# Patient Record
Sex: MALE | Race: WHITE | HISPANIC OR LATINO | URBAN - METROPOLITAN AREA
[De-identification: names, ages, dates, MRNs, and addresses within clinical notes are randomized per-mention and may not be internally consistent; named-entity substitution may affect disease eponyms.]

---

## 2018-01-27 NOTE — PATIENT DISCUSSION
Monitor for changes. OTC Zaditor or Alaway prn for itching. Call if symptoms worsen or do not improve. [id number]  in 1 year for exam, sooner if problems or changes.

## 2020-03-16 ENCOUNTER — NEW REFERRAL (OUTPATIENT)
Dept: URBAN - METROPOLITAN AREA CLINIC 33 | Facility: CLINIC | Age: 70
End: 2020-03-16

## 2020-03-16 VITALS
DIASTOLIC BLOOD PRESSURE: 80 MMHG | HEART RATE: 64 BPM | SYSTOLIC BLOOD PRESSURE: 120 MMHG | WEIGHT: 178 LBS | HEIGHT: 72 IN | BODY MASS INDEX: 24.11 KG/M2

## 2020-03-16 DIAGNOSIS — E11.9: ICD-10-CM

## 2020-03-16 DIAGNOSIS — H35.361: ICD-10-CM

## 2020-03-16 PROCEDURE — 92004 COMPRE OPH EXAM NEW PT 1/>: CPT

## 2020-03-16 PROCEDURE — 92250 FUNDUS PHOTOGRAPHY W/I&R: CPT

## 2020-03-16 ASSESSMENT — VISUAL ACUITY
OS_SC: 20/25+1
OD_SC: 20/40-1

## 2020-03-16 ASSESSMENT — TONOMETRY
OD_IOP_MMHG: 7
OS_IOP_MMHG: 10

## 2020-06-29 ENCOUNTER — FOLLOW UP (OUTPATIENT)
Dept: URBAN - METROPOLITAN AREA CLINIC 33 | Facility: CLINIC | Age: 70
End: 2020-06-29

## 2020-06-29 DIAGNOSIS — Z96.1: ICD-10-CM

## 2020-06-29 DIAGNOSIS — H35.361: ICD-10-CM

## 2020-06-29 DIAGNOSIS — E11.9: ICD-10-CM

## 2020-06-29 DIAGNOSIS — H25.12: ICD-10-CM

## 2020-06-29 PROCEDURE — 92134 CPTRZ OPH DX IMG PST SGM RTA: CPT

## 2020-06-29 PROCEDURE — 92014 COMPRE OPH EXAM EST PT 1/>: CPT

## 2020-06-29 PROCEDURE — 92250 FUNDUS PHOTOGRAPHY W/I&R: CPT

## 2020-06-29 ASSESSMENT — TONOMETRY
OS_IOP_MMHG: 13
OD_IOP_MMHG: 10

## 2020-06-29 ASSESSMENT — VISUAL ACUITY
OS_CC: 20/20
OD_CC: 20/25

## 2020-09-28 ENCOUNTER — FOLLOW UP (OUTPATIENT)
Dept: URBAN - METROPOLITAN AREA CLINIC 33 | Facility: CLINIC | Age: 70
End: 2020-09-28

## 2020-09-28 DIAGNOSIS — Z96.1: ICD-10-CM

## 2020-09-28 DIAGNOSIS — E11.9: ICD-10-CM

## 2020-09-28 DIAGNOSIS — H35.361: ICD-10-CM

## 2020-09-28 PROCEDURE — 92250 FUNDUS PHOTOGRAPHY W/I&R: CPT

## 2020-09-28 PROCEDURE — 92014 COMPRE OPH EXAM EST PT 1/>: CPT

## 2020-09-28 ASSESSMENT — TONOMETRY
OD_IOP_MMHG: 10
OS_IOP_MMHG: 12

## 2020-09-28 ASSESSMENT — VISUAL ACUITY
OS_CC: 20/25+2
OD_CC: 20/20-2

## 2020-12-28 ENCOUNTER — NEW PATIENT COMPREHENSIVE (OUTPATIENT)
Dept: URBAN - METROPOLITAN AREA CLINIC 32 | Facility: CLINIC | Age: 70
End: 2020-12-28

## 2020-12-28 DIAGNOSIS — Z96.1: ICD-10-CM

## 2020-12-28 DIAGNOSIS — H25.12: ICD-10-CM

## 2020-12-28 PROCEDURE — 92002 INTRM OPH EXAM NEW PATIENT: CPT

## 2020-12-28 ASSESSMENT — KERATOMETRY
OS_AXISANGLE2_DEGREES: 62
OS_AXISANGLE_DEGREES: 152
OD_K1POWER_DIOPTERS: 43.75
OD_AXISANGLE_DEGREES: 173
OD_AXISANGLE2_DEGREES: 83
OD_K2POWER_DIOPTERS: 43.25
OS_K2POWER_DIOPTERS: 43.75
OS_K1POWER_DIOPTERS: 44.25

## 2020-12-28 ASSESSMENT — VISUAL ACUITY
OD_CC: 20/40
OS_CC: 20/30-1
OS_CC: J1
OD_CC: J5
OS_SC: 20/80
OD_SC: 20/100

## 2020-12-28 ASSESSMENT — TONOMETRY
OD_IOP_MMHG: 10
OS_IOP_MMHG: 14
OS_IOP_MMHG: 15
OD_IOP_MMHG: 10

## 2021-01-01 ENCOUNTER — OFFICE VISIT (OUTPATIENT)
Dept: URBAN - METROPOLITAN AREA CLINIC 68 | Facility: CLINIC | Age: 71
End: 2021-01-01

## 2021-05-03 ENCOUNTER — FOLLOW UP (OUTPATIENT)
Dept: URBAN - METROPOLITAN AREA CLINIC 33 | Facility: CLINIC | Age: 71
End: 2021-05-03

## 2021-05-03 VITALS — WEIGHT: 183 LBS | HEIGHT: 68 IN | BODY MASS INDEX: 27.74 KG/M2

## 2021-05-03 DIAGNOSIS — E11.9: ICD-10-CM

## 2021-05-03 DIAGNOSIS — H35.361: ICD-10-CM

## 2021-05-03 DIAGNOSIS — H25.12: ICD-10-CM

## 2021-05-03 DIAGNOSIS — H35.371: ICD-10-CM

## 2021-05-03 PROCEDURE — 92250 FUNDUS PHOTOGRAPHY W/I&R: CPT

## 2021-05-03 PROCEDURE — 92014 COMPRE OPH EXAM EST PT 1/>: CPT

## 2021-05-03 ASSESSMENT — TONOMETRY
OD_IOP_MMHG: 12
OS_IOP_MMHG: 13

## 2021-05-03 ASSESSMENT — VISUAL ACUITY
OS_CC: 20/20
OD_CC: 20/20-2

## 2021-07-12 ENCOUNTER — FOLLOW UP (OUTPATIENT)
Dept: URBAN - METROPOLITAN AREA CLINIC 32 | Facility: CLINIC | Age: 71
End: 2021-07-12

## 2021-07-12 DIAGNOSIS — H25.12: ICD-10-CM

## 2021-07-12 DIAGNOSIS — Z96.1: ICD-10-CM

## 2021-07-12 PROCEDURE — 92012 INTRM OPH EXAM EST PATIENT: CPT

## 2021-07-12 ASSESSMENT — TONOMETRY
OS_IOP_MMHG: 8
OD_IOP_MMHG: 6

## 2021-07-12 ASSESSMENT — VISUAL ACUITY
OD_CC: 20/25-2
OS_CC: 20/20

## 2021-07-12 ASSESSMENT — KERATOMETRY
OD_AXISANGLE_DEGREES: 173
OS_AXISANGLE2_DEGREES: 62
OD_AXISANGLE2_DEGREES: 83
OD_K2POWER_DIOPTERS: 43.25
OD_K1POWER_DIOPTERS: 43.75
OS_K2POWER_DIOPTERS: 43.75
OS_K1POWER_DIOPTERS: 44.25
OS_AXISANGLE_DEGREES: 152

## 2021-09-02 ENCOUNTER — OFFICE VISIT (OUTPATIENT)
Dept: URBAN - METROPOLITAN AREA CLINIC 68 | Facility: CLINIC | Age: 71
End: 2021-09-02

## 2021-09-09 ENCOUNTER — TELEPHONE ENCOUNTER (OUTPATIENT)
Dept: URBAN - METROPOLITAN AREA CLINIC 68 | Facility: CLINIC | Age: 71
End: 2021-09-09

## 2021-09-10 ENCOUNTER — OFFICE VISIT (OUTPATIENT)
Dept: URBAN - METROPOLITAN AREA SURGERY CENTER 12 | Facility: SURGERY CENTER | Age: 71
End: 2021-09-10

## 2021-09-14 ENCOUNTER — TELEPHONE ENCOUNTER (OUTPATIENT)
Dept: URBAN - METROPOLITAN AREA CLINIC 68 | Facility: CLINIC | Age: 71
End: 2021-09-14

## 2021-09-15 ENCOUNTER — TELEPHONE ENCOUNTER (OUTPATIENT)
Dept: URBAN - METROPOLITAN AREA CLINIC 68 | Facility: CLINIC | Age: 71
End: 2021-09-15

## 2021-09-15 ENCOUNTER — OFFICE VISIT (OUTPATIENT)
Dept: URBAN - METROPOLITAN AREA SURGERY CENTER 12 | Facility: SURGERY CENTER | Age: 71
End: 2021-09-15

## 2021-09-16 ENCOUNTER — LAB OUTSIDE AN ENCOUNTER (OUTPATIENT)
Dept: URBAN - METROPOLITAN AREA CLINIC 68 | Facility: CLINIC | Age: 71
End: 2021-09-16

## 2021-09-16 LAB — 01: (no result)

## 2021-09-17 ENCOUNTER — TELEPHONE ENCOUNTER (OUTPATIENT)
Dept: URBAN - METROPOLITAN AREA CLINIC 68 | Facility: CLINIC | Age: 71
End: 2021-09-17

## 2021-09-30 ENCOUNTER — OFFICE VISIT (OUTPATIENT)
Dept: URBAN - METROPOLITAN AREA SURGERY CENTER 12 | Facility: SURGERY CENTER | Age: 71
End: 2021-09-30

## 2022-06-04 ENCOUNTER — TELEPHONE ENCOUNTER (OUTPATIENT)
Dept: URBAN - METROPOLITAN AREA CLINIC 68 | Facility: CLINIC | Age: 72
End: 2022-06-04

## 2022-06-04 RX ORDER — SODIUM SULFATE, POTASSIUM SULFATE, MAGNESIUM SULFATE 17.5; 3.13; 1.6 G/ML; G/ML; G/ML
SOLUTION, CONCENTRATE ORAL AS DIRECTED
Qty: 1 | Refills: 0 | OUTPATIENT
Start: 2021-09-02 | End: 2021-09-03

## 2022-06-05 ENCOUNTER — TELEPHONE ENCOUNTER (OUTPATIENT)
Dept: URBAN - METROPOLITAN AREA CLINIC 68 | Facility: CLINIC | Age: 72
End: 2022-06-05

## 2022-06-05 RX ORDER — CLOPIDOGREL BISULFATE 75 MG
PLAVIX( 75MG ORAL   ) ACTIVE -HX ENTRY TABLET ORAL
Status: ACTIVE | COMMUNITY
Start: 2021-09-02

## 2022-06-05 RX ORDER — SILODOSIN 4 MG/1
RAPAFLO( 4MG ORAL   ) ACTIVE -HX ENTRY CAPSULE ORAL
Status: ACTIVE | COMMUNITY
Start: 2021-09-02

## 2022-06-05 RX ORDER — PITAVASTATIN CALCIUM 1.04 MG/1
LIVALO( 1MG ORAL   ) ACTIVE -HX ENTRY TABLET, FILM COATED ORAL
Status: ACTIVE | COMMUNITY
Start: 2021-09-02

## 2022-06-05 RX ORDER — OMEGA-3-ACID ETHYL ESTERS 1 G/1
LOVAZA( 1GM ORAL   ) ACTIVE -HX ENTRY CAPSULE, LIQUID FILLED ORAL
Status: ACTIVE | COMMUNITY
Start: 2021-09-02

## 2022-06-05 RX ORDER — CARVEDILOL 3.12 MG/1
CARVEDILOL( 3.125MG ORAL   ) ACTIVE -HX ENTRY TABLET, FILM COATED ORAL
Status: ACTIVE | COMMUNITY
Start: 2021-09-02

## 2022-06-05 RX ORDER — MEMANTINE HYDROCHLORIDE 5 MG/1
MEMANTINE HCL( 5MG ORAL   ) ACTIVE -HX ENTRY TABLET, FILM COATED ORAL
Status: ACTIVE | COMMUNITY
Start: 2021-09-02

## 2022-06-05 RX ORDER — METFORMIN HYDROCHLORIDE 500 MG/1
METFORMIN HCL( 500MG ORAL   ) ACTIVE -HX ENTRY TABLET, COATED ORAL
Status: ACTIVE | COMMUNITY
Start: 2021-09-02

## 2022-06-25 ENCOUNTER — TELEPHONE ENCOUNTER (OUTPATIENT)
Age: 72
End: 2022-06-25

## 2022-06-25 RX ORDER — SODIUM SULFATE, POTASSIUM SULFATE, MAGNESIUM SULFATE 17.5; 3.13; 1.6 G/ML; G/ML; G/ML
SOLUTION, CONCENTRATE ORAL AS DIRECTED
Qty: 1 | Refills: 0 | OUTPATIENT
Start: 2021-09-02 | End: 2021-09-03

## 2022-06-26 ENCOUNTER — TELEPHONE ENCOUNTER (OUTPATIENT)
Age: 72
End: 2022-06-26

## 2022-06-26 RX ORDER — CARVEDILOL 3.12 MG/1
CARVEDILOL( 3.125MG ORAL   ) ACTIVE -HX ENTRY TABLET, FILM COATED ORAL
Status: ACTIVE | COMMUNITY
Start: 2021-09-02

## 2022-06-26 RX ORDER — PITAVASTATIN CALCIUM 1.04 MG/1
LIVALO( 1MG ORAL   ) ACTIVE -HX ENTRY TABLET, FILM COATED ORAL
Status: ACTIVE | COMMUNITY
Start: 2021-09-02

## 2022-06-26 RX ORDER — METFORMIN HCL 500 MG/1
METFORMIN HCL( 500MG ORAL   ) ACTIVE -HX ENTRY TABLET ORAL
Status: ACTIVE | COMMUNITY
Start: 2021-09-02

## 2022-06-26 RX ORDER — SILODOSIN 4 MG/1
RAPAFLO( 4MG ORAL   ) ACTIVE -HX ENTRY CAPSULE ORAL
Status: ACTIVE | COMMUNITY
Start: 2021-09-02

## 2022-06-26 RX ORDER — MEMANTINE HYDROCHLORIDE 5 MG/1
MEMANTINE HCL( 5MG ORAL   ) ACTIVE -HX ENTRY TABLET, FILM COATED ORAL
Status: ACTIVE | COMMUNITY
Start: 2021-09-02

## 2022-06-26 RX ORDER — OMEGA-3-ACID ETHYL ESTERS 1 G/1
LOVAZA( 1GM ORAL   ) ACTIVE -HX ENTRY CAPSULE, LIQUID FILLED ORAL
Status: ACTIVE | COMMUNITY
Start: 2021-09-02

## 2022-06-26 RX ORDER — CLOPIDOGREL BISULFATE 75 MG
PLAVIX( 75MG ORAL   ) ACTIVE -HX ENTRY TABLET ORAL
Status: ACTIVE | COMMUNITY
Start: 2021-09-02

## 2023-08-22 ENCOUNTER — OFFICE VISIT (OUTPATIENT)
Dept: URBAN - METROPOLITAN AREA CLINIC 68 | Facility: CLINIC | Age: 73
End: 2023-08-22
Payer: MEDICARE

## 2023-08-22 ENCOUNTER — WEB ENCOUNTER (OUTPATIENT)
Dept: URBAN - METROPOLITAN AREA CLINIC 68 | Facility: CLINIC | Age: 73
End: 2023-08-22

## 2023-08-22 ENCOUNTER — LAB OUTSIDE AN ENCOUNTER (OUTPATIENT)
Dept: URBAN - METROPOLITAN AREA CLINIC 68 | Facility: CLINIC | Age: 73
End: 2023-08-22

## 2023-08-22 VITALS
DIASTOLIC BLOOD PRESSURE: 82 MMHG | OXYGEN SATURATION: 98 % | WEIGHT: 184 LBS | HEIGHT: 71 IN | HEART RATE: 69 BPM | SYSTOLIC BLOOD PRESSURE: 140 MMHG | BODY MASS INDEX: 25.76 KG/M2

## 2023-08-22 DIAGNOSIS — K62.89 NODULE OF ANUS: ICD-10-CM

## 2023-08-22 PROCEDURE — 99214 OFFICE O/P EST MOD 30 MIN: CPT | Performed by: INTERNAL MEDICINE

## 2023-08-22 RX ORDER — MEMANTINE HYDROCHLORIDE 5 MG/1
MEMANTINE HCL( 5MG ORAL   ) ACTIVE -HX ENTRY TABLET, FILM COATED ORAL
Status: ACTIVE | COMMUNITY
Start: 2021-09-02

## 2023-08-22 RX ORDER — CLOPIDOGREL BISULFATE 75 MG
PLAVIX( 75MG ORAL   ) ACTIVE -HX ENTRY TABLET ORAL
Status: ACTIVE | COMMUNITY
Start: 2021-09-02

## 2023-08-22 RX ORDER — PITAVASTATIN CALCIUM 1.04 MG/1
LIVALO( 1MG ORAL   ) ACTIVE -HX ENTRY TABLET, FILM COATED ORAL
Status: ACTIVE | COMMUNITY
Start: 2021-09-02

## 2023-08-22 RX ORDER — SILODOSIN 4 MG/1
RAPAFLO( 4MG ORAL   ) ACTIVE -HX ENTRY CAPSULE ORAL
Status: ACTIVE | COMMUNITY
Start: 2021-09-02

## 2023-08-22 RX ORDER — CARVEDILOL 3.12 MG/1
CARVEDILOL( 3.125MG ORAL   ) ACTIVE -HX ENTRY TABLET, FILM COATED ORAL
Status: ACTIVE | COMMUNITY
Start: 2021-09-02

## 2023-08-22 RX ORDER — METFORMIN HCL 500 MG/1
METFORMIN HCL( 500MG ORAL   ) ACTIVE -HX ENTRY TABLET ORAL
Status: ACTIVE | COMMUNITY
Start: 2021-09-02

## 2023-08-22 RX ORDER — OMEGA-3-ACID ETHYL ESTERS 1 G/1
LOVAZA( 1GM ORAL   ) ACTIVE -HX ENTRY CAPSULE, LIQUID FILLED ORAL
Status: ACTIVE | COMMUNITY
Start: 2021-09-02

## 2023-08-22 NOTE — HPI-TODAY'S VISIT:
Case of a 73 YOM that comes in today for evaluation. He refers that for the past 3 months he has been experiencing perianal discomfort.  He feels a perianal bulge.  He also complains of itching in the rectal area.  Denies hematochezia/rectal bleeding.  Denies pain at defecation.  He comes in today for evaluation.

## 2023-08-22 NOTE — PHYSICAL EXAM GASTROINTESTINAL
Abdomen , soft, nontender, nondistended , no guarding or rigidity , no masses palpable , normal bowel sounds , Liver and Spleen , no hepatomegaly present , no hepatosplenomegaly , liver nontender , spleen not palpable rectal: Digital rectal exam performed no perianal bulge noted no rectal tenderness upon digital rectal exam Advised patient.

## 2023-09-07 ENCOUNTER — OFFICE VISIT (OUTPATIENT)
Dept: URBAN - METROPOLITAN AREA SURGERY CENTER 12 | Facility: SURGERY CENTER | Age: 73
End: 2023-09-07
Payer: MEDICARE

## 2023-09-07 ENCOUNTER — TELEPHONE ENCOUNTER (OUTPATIENT)
Dept: URBAN - METROPOLITAN AREA CLINIC 68 | Facility: CLINIC | Age: 73
End: 2023-09-07

## 2023-09-07 ENCOUNTER — LAB OUTSIDE AN ENCOUNTER (OUTPATIENT)
Dept: URBAN - METROPOLITAN AREA CLINIC 68 | Facility: CLINIC | Age: 73
End: 2023-09-07

## 2023-09-07 DIAGNOSIS — K64.8 OTHER HEMORRHOIDS: ICD-10-CM

## 2023-09-07 DIAGNOSIS — K63.89 OTHER SPECIFIED DISEASES OF INTESTINE: ICD-10-CM

## 2023-09-07 PROCEDURE — 45330 DIAGNOSTIC SIGMOIDOSCOPY: CPT | Performed by: INTERNAL MEDICINE

## 2023-09-07 RX ORDER — SILODOSIN 4 MG/1
RAPAFLO( 4MG ORAL   ) ACTIVE -HX ENTRY CAPSULE ORAL
Status: ACTIVE | COMMUNITY
Start: 2021-09-02

## 2023-09-07 RX ORDER — CLOPIDOGREL BISULFATE 75 MG
PLAVIX( 75MG ORAL   ) ACTIVE -HX ENTRY TABLET ORAL
Status: ACTIVE | COMMUNITY
Start: 2021-09-02

## 2023-09-07 RX ORDER — MEMANTINE HYDROCHLORIDE 5 MG/1
MEMANTINE HCL( 5MG ORAL   ) ACTIVE -HX ENTRY TABLET, FILM COATED ORAL
Status: ACTIVE | COMMUNITY
Start: 2021-09-02

## 2023-09-07 RX ORDER — METFORMIN HCL 500 MG/1
METFORMIN HCL( 500MG ORAL   ) ACTIVE -HX ENTRY TABLET ORAL
Status: ACTIVE | COMMUNITY
Start: 2021-09-02

## 2023-09-07 RX ORDER — OMEGA-3-ACID ETHYL ESTERS 1 G/1
LOVAZA( 1GM ORAL   ) ACTIVE -HX ENTRY CAPSULE, LIQUID FILLED ORAL
Status: ACTIVE | COMMUNITY
Start: 2021-09-02

## 2023-09-07 RX ORDER — CARVEDILOL 3.12 MG/1
CARVEDILOL( 3.125MG ORAL   ) ACTIVE -HX ENTRY TABLET, FILM COATED ORAL
Status: ACTIVE | COMMUNITY
Start: 2021-09-02

## 2023-09-07 RX ORDER — PITAVASTATIN CALCIUM 1.04 MG/1
LIVALO( 1MG ORAL   ) ACTIVE -HX ENTRY TABLET, FILM COATED ORAL
Status: ACTIVE | COMMUNITY
Start: 2021-09-02

## 2023-10-03 ENCOUNTER — TELEPHONE ENCOUNTER (OUTPATIENT)
Dept: URBAN - METROPOLITAN AREA CLINIC 68 | Facility: CLINIC | Age: 73
End: 2023-10-03

## 2023-10-10 ENCOUNTER — OFFICE VISIT (OUTPATIENT)
Dept: URBAN - METROPOLITAN AREA CLINIC 68 | Facility: CLINIC | Age: 73
End: 2023-10-10
Payer: MEDICARE

## 2023-10-10 ENCOUNTER — TELEPHONE ENCOUNTER (OUTPATIENT)
Dept: URBAN - METROPOLITAN AREA CLINIC 68 | Facility: CLINIC | Age: 73
End: 2023-10-10

## 2023-10-10 VITALS
HEIGHT: 71 IN | DIASTOLIC BLOOD PRESSURE: 80 MMHG | SYSTOLIC BLOOD PRESSURE: 138 MMHG | BODY MASS INDEX: 25.76 KG/M2 | OXYGEN SATURATION: 98 % | WEIGHT: 184 LBS | HEART RATE: 70 BPM

## 2023-10-10 DIAGNOSIS — N40.0 ENLARGED PROSTATE: ICD-10-CM

## 2023-10-10 DIAGNOSIS — K62.89 NODULE OF ANUS: ICD-10-CM

## 2023-10-10 PROBLEM — 249607009: Status: ACTIVE | Noted: 2023-10-10

## 2023-10-10 PROBLEM — 119523007: Status: ACTIVE | Noted: 2023-08-22

## 2023-10-10 PROCEDURE — 99212 OFFICE O/P EST SF 10 MIN: CPT | Performed by: INTERNAL MEDICINE

## 2023-10-10 RX ORDER — CARVEDILOL 3.12 MG/1
CARVEDILOL( 3.125MG ORAL   ) ACTIVE -HX ENTRY TABLET, FILM COATED ORAL
Status: ACTIVE | COMMUNITY
Start: 2021-09-02

## 2023-10-10 RX ORDER — METFORMIN HCL 500 MG/1
METFORMIN HCL( 500MG ORAL   ) ACTIVE -HX ENTRY TABLET ORAL
Status: ACTIVE | COMMUNITY
Start: 2021-09-02

## 2023-10-10 RX ORDER — CLOPIDOGREL BISULFATE 75 MG
PLAVIX( 75MG ORAL   ) ACTIVE -HX ENTRY TABLET ORAL
Status: ACTIVE | COMMUNITY
Start: 2021-09-02

## 2023-10-10 RX ORDER — PITAVASTATIN CALCIUM 1.04 MG/1
LIVALO( 1MG ORAL   ) ACTIVE -HX ENTRY TABLET, FILM COATED ORAL
Status: ACTIVE | COMMUNITY
Start: 2021-09-02

## 2023-10-10 RX ORDER — OMEGA-3-ACID ETHYL ESTERS 1 G/1
LOVAZA( 1GM ORAL   ) ACTIVE -HX ENTRY CAPSULE, LIQUID FILLED ORAL
Status: ACTIVE | COMMUNITY
Start: 2021-09-02

## 2023-10-10 RX ORDER — SILODOSIN 4 MG/1
RAPAFLO( 4MG ORAL   ) ACTIVE -HX ENTRY CAPSULE ORAL
Status: ACTIVE | COMMUNITY
Start: 2021-09-02

## 2023-10-10 RX ORDER — MEMANTINE HYDROCHLORIDE 5 MG/1
MEMANTINE HCL( 5MG ORAL   ) ACTIVE -HX ENTRY TABLET, FILM COATED ORAL
Status: ACTIVE | COMMUNITY
Start: 2021-09-02

## 2023-10-10 NOTE — HPI-TODAY'S VISIT:
Case of a 73 YOM that comes in today for evaluation. He refers that for the past 3 months he has been experiencing perianal discomfort.  He feels a perianal bulge.  He also complains of itching in the rectal area. He was started in local care with cortisone base hemorrhoidal ointments. He also underwent Flex sig and ct of the pelvis. Results are detailed below. He comes in today for follow up.  He refers he is feeling fine.  His perianal symptoms have resolved.

## 2024-02-08 ENCOUNTER — OV EP (OUTPATIENT)
Dept: URBAN - METROPOLITAN AREA CLINIC 68 | Facility: CLINIC | Age: 74
End: 2024-02-08
Payer: MEDICARE

## 2024-02-08 VITALS
HEIGHT: 71 IN | SYSTOLIC BLOOD PRESSURE: 132 MMHG | WEIGHT: 184 LBS | DIASTOLIC BLOOD PRESSURE: 80 MMHG | BODY MASS INDEX: 25.76 KG/M2

## 2024-02-08 DIAGNOSIS — L29.0 RECTAL ITCHING: ICD-10-CM

## 2024-02-08 DIAGNOSIS — K62.89 ANAL BURNING: ICD-10-CM

## 2024-02-08 PROBLEM — 68653001: Status: ACTIVE | Noted: 2024-02-08

## 2024-02-08 PROBLEM — 90446007: Status: ACTIVE | Noted: 2024-02-08

## 2024-02-08 PROCEDURE — 99214 OFFICE O/P EST MOD 30 MIN: CPT | Performed by: INTERNAL MEDICINE

## 2024-02-08 RX ORDER — SILODOSIN 4 MG/1
RAPAFLO( 4MG ORAL   ) ACTIVE -HX ENTRY CAPSULE ORAL
Status: ACTIVE | COMMUNITY
Start: 2021-09-02

## 2024-02-08 RX ORDER — CARVEDILOL 3.12 MG/1
CARVEDILOL( 3.125MG ORAL   ) ACTIVE -HX ENTRY TABLET, FILM COATED ORAL
Status: ACTIVE | COMMUNITY
Start: 2021-09-02

## 2024-02-08 RX ORDER — CLOPIDOGREL BISULFATE 75 MG
PLAVIX( 75MG ORAL   ) ACTIVE -HX ENTRY TABLET ORAL
Status: ACTIVE | COMMUNITY
Start: 2021-09-02

## 2024-02-08 RX ORDER — MEMANTINE HYDROCHLORIDE 5 MG/1
MEMANTINE HCL( 5MG ORAL   ) ACTIVE -HX ENTRY TABLET, FILM COATED ORAL
Status: ACTIVE | COMMUNITY
Start: 2021-09-02

## 2024-02-08 RX ORDER — PITAVASTATIN CALCIUM 1.04 MG/1
LIVALO( 1MG ORAL   ) ACTIVE -HX ENTRY TABLET, FILM COATED ORAL
Status: ACTIVE | COMMUNITY
Start: 2021-09-02

## 2024-02-08 RX ORDER — OMEGA-3-ACID ETHYL ESTERS 1 G/1
LOVAZA( 1GM ORAL   ) ACTIVE -HX ENTRY CAPSULE, LIQUID FILLED ORAL
Status: ACTIVE | COMMUNITY
Start: 2021-09-02

## 2024-02-08 RX ORDER — METFORMIN HCL 500 MG/1
METFORMIN HCL( 500MG ORAL   ) ACTIVE -HX ENTRY TABLET ORAL
Status: ACTIVE | COMMUNITY
Start: 2021-09-02

## 2024-02-08 NOTE — PHYSICAL EXAM GASTROINTESTINAL
Abdomen , soft, nontender, nondistended , no guarding or rigidity , no masses palpable , normal bowel sounds , Liver and Spleen , no hepatomegaly present , no hepatosplenomegaly , liver nontender , spleen not palpable Rectal: Julieth Tom present no masses lesion seen no thrombose hemorrhoid seen

## 2024-02-08 NOTE — HPI-TODAY'S VISIT:
Case of a 73-year-old male patient that comes in today for evaluation.  Patient refers that for some time now he has been experiencing what he describes as perianal discomfort.  He voices perianal itching and burning.  Some burning pain after defecation.  He has used over-the-counter hemorrhoidal ointments with limited response.  Denies melena hematochezia hematemesis coffee-ground emesis.  He comes in today for evaluation.

## 2024-02-21 ENCOUNTER — OV EP (OUTPATIENT)
Dept: URBAN - METROPOLITAN AREA CLINIC 68 | Facility: CLINIC | Age: 74
End: 2024-02-21

## 2024-02-21 VITALS
BODY MASS INDEX: 25.76 KG/M2 | DIASTOLIC BLOOD PRESSURE: 78 MMHG | HEIGHT: 71 IN | SYSTOLIC BLOOD PRESSURE: 130 MMHG | WEIGHT: 184 LBS

## 2024-02-21 RX ORDER — OMEGA-3-ACID ETHYL ESTERS 1 G/1
LOVAZA( 1GM ORAL   ) ACTIVE -HX ENTRY CAPSULE, LIQUID FILLED ORAL
Status: ACTIVE | COMMUNITY
Start: 2021-09-02

## 2024-02-21 RX ORDER — MEMANTINE HYDROCHLORIDE 5 MG/1
MEMANTINE HCL( 5MG ORAL   ) ACTIVE -HX ENTRY TABLET, FILM COATED ORAL
Status: ACTIVE | COMMUNITY
Start: 2021-09-02

## 2024-02-21 RX ORDER — CLOPIDOGREL BISULFATE 75 MG
PLAVIX( 75MG ORAL   ) ACTIVE -HX ENTRY TABLET ORAL
Status: ACTIVE | COMMUNITY
Start: 2021-09-02

## 2024-02-21 RX ORDER — CARVEDILOL 3.12 MG/1
CARVEDILOL( 3.125MG ORAL   ) ACTIVE -HX ENTRY TABLET, FILM COATED ORAL
Status: ACTIVE | COMMUNITY
Start: 2021-09-02

## 2024-02-21 RX ORDER — METFORMIN HCL 500 MG/1
METFORMIN HCL( 500MG ORAL   ) ACTIVE -HX ENTRY TABLET ORAL
Status: ACTIVE | COMMUNITY
Start: 2021-09-02

## 2024-02-21 RX ORDER — SILODOSIN 4 MG/1
RAPAFLO( 4MG ORAL   ) ACTIVE -HX ENTRY CAPSULE ORAL
Status: ACTIVE | COMMUNITY
Start: 2021-09-02

## 2024-02-21 RX ORDER — PITAVASTATIN CALCIUM 1.04 MG/1
LIVALO( 1MG ORAL   ) ACTIVE -HX ENTRY TABLET, FILM COATED ORAL
Status: ACTIVE | COMMUNITY
Start: 2021-09-02

## 2024-02-21 NOTE — HPI-TODAY'S VISIT:
Case of a 73-year-old male patient that comes in today for evaluation.  Patient refers that for some time now he has been experiencing what he describes as perianal discomfort.  He voices perianal itching and burning.  Some burning pain after defecation.  He has used over-the-counter hemorrhoidal ointments with limited response.  Denies melena hematochezia hematemesis coffee-ground emesis.  He comes in today for evaluation. He refers today that his symptoms have resolve.

## 2024-02-21 NOTE — PHYSICAL EXAM SKIN:
.  Chief Complaint   Patient presents with    Dental Pain     Edema noted to upper lip and face      Pt ambulate to triage with above complaint. Pain x 3 days.    Pt educated on triage process and returned to lobby.    no rashes , no jaundice present , good turgor , no masses , no tenderness on palpation

## 2024-02-21 NOTE — HPI-TODAY'S VISIT:
Case of a 73-year-old male patient that comes in today for evaluation.  Patient refers that for some time now he has been experiencing what he describes as perianal discomfort.  He voices perianal itching and burning.  Some burning pain after defecation.  He has used over-the-counter hemorrhoidal ointments with limited response. On the last appointment he was started on cortisone base hemorrhoidal ointment and fiber supplementation. He comes in today for follow up     Denies melena hematochezia hematemesis coffee-ground emesis.

## 2024-02-22 ENCOUNTER — OV EP (OUTPATIENT)
Dept: URBAN - METROPOLITAN AREA CLINIC 68 | Facility: CLINIC | Age: 74
End: 2024-02-22

## 2024-05-24 ENCOUNTER — TELEPHONE ENCOUNTER (OUTPATIENT)
Dept: URBAN - METROPOLITAN AREA CLINIC 68 | Facility: CLINIC | Age: 74
End: 2024-05-24

## 2024-05-28 ENCOUNTER — TELEPHONE ENCOUNTER (OUTPATIENT)
Dept: URBAN - METROPOLITAN AREA CLINIC 68 | Facility: CLINIC | Age: 74
End: 2024-05-28

## 2024-05-31 ENCOUNTER — DASHBOARD ENCOUNTERS (OUTPATIENT)
Age: 74
End: 2024-05-31

## 2024-06-05 ENCOUNTER — LAB OUTSIDE AN ENCOUNTER (OUTPATIENT)
Dept: URBAN - METROPOLITAN AREA CLINIC 68 | Facility: CLINIC | Age: 74
End: 2024-06-05

## 2024-06-05 ENCOUNTER — OFFICE VISIT (OUTPATIENT)
Dept: URBAN - METROPOLITAN AREA CLINIC 68 | Facility: CLINIC | Age: 74
End: 2024-06-05
Payer: MEDICARE

## 2024-06-05 DIAGNOSIS — K64.1 GRADE II HEMORRHOIDS: ICD-10-CM

## 2024-06-05 PROCEDURE — 46221 LIGATION OF HEMORRHOID(S): CPT | Performed by: INTERNAL MEDICINE

## 2024-06-05 PROCEDURE — 99212 OFFICE O/P EST SF 10 MIN: CPT | Performed by: INTERNAL MEDICINE

## 2024-06-05 RX ORDER — CARVEDILOL 3.12 MG/1
CARVEDILOL( 3.125MG ORAL   ) ACTIVE -HX ENTRY TABLET, FILM COATED ORAL
Status: ACTIVE | COMMUNITY
Start: 2021-09-02

## 2024-06-05 RX ORDER — METFORMIN HCL 500 MG/1
METFORMIN HCL( 500MG ORAL   ) ACTIVE -HX ENTRY TABLET ORAL
Status: ACTIVE | COMMUNITY
Start: 2021-09-02

## 2024-06-05 RX ORDER — MEMANTINE HYDROCHLORIDE 5 MG/1
MEMANTINE HCL( 5MG ORAL   ) ACTIVE -HX ENTRY TABLET, FILM COATED ORAL
Status: ACTIVE | COMMUNITY
Start: 2021-09-02

## 2024-06-05 RX ORDER — OMEGA-3-ACID ETHYL ESTERS 1 G/1
LOVAZA( 1GM ORAL   ) ACTIVE -HX ENTRY CAPSULE, LIQUID FILLED ORAL
Status: ACTIVE | COMMUNITY
Start: 2021-09-02

## 2024-06-05 RX ORDER — SILODOSIN 4 MG/1
RAPAFLO( 4MG ORAL   ) ACTIVE -HX ENTRY CAPSULE ORAL
Status: ACTIVE | COMMUNITY
Start: 2021-09-02

## 2024-06-05 RX ORDER — CLOPIDOGREL BISULFATE 75 MG
PLAVIX( 75MG ORAL   ) ACTIVE -HX ENTRY TABLET ORAL
Status: ACTIVE | COMMUNITY
Start: 2021-09-02

## 2024-06-05 RX ORDER — PITAVASTATIN CALCIUM 1.04 MG/1
LIVALO( 1MG ORAL   ) ACTIVE -HX ENTRY TABLET, FILM COATED ORAL
Status: ACTIVE | COMMUNITY
Start: 2021-09-02

## 2024-06-05 NOTE — HPI-TODAY'S VISIT:
Case of a 73-year-old male patient that comes in today for evaluation.  Patient refers that for some time now he has been experiencing what he describes as perianal discomfort.  He voices perianal itching and burning.  Some burning pain after defecation.  He has used over-the-counter hemorrhoidal ointments with limited response. On the last appointment he was started on cortisone base hemorrhoidal ointment and fiber supplementation. He comes in today for follow up. He continues with hemorrhoidal symptoms. He has some prolapsing hemorrhoidal tissue intermittently. Denies melena hematochezia hematemesis coffee-ground emesis.

## 2024-06-20 ENCOUNTER — OFFICE VISIT (OUTPATIENT)
Dept: URBAN - METROPOLITAN AREA CLINIC 68 | Facility: CLINIC | Age: 74
End: 2024-06-20
Payer: MEDICARE

## 2024-06-20 DIAGNOSIS — K64.1 GRADE II HEMORRHOIDS: ICD-10-CM

## 2024-06-20 PROBLEM — 721704005: Status: ACTIVE | Noted: 2024-06-05

## 2024-06-20 PROCEDURE — 99212 OFFICE O/P EST SF 10 MIN: CPT | Performed by: INTERNAL MEDICINE

## 2024-06-20 PROCEDURE — 46221 LIGATION OF HEMORRHOID(S): CPT | Performed by: INTERNAL MEDICINE

## 2024-06-20 RX ORDER — OMEGA-3-ACID ETHYL ESTERS 1 G/1
LOVAZA( 1GM ORAL   ) ACTIVE -HX ENTRY CAPSULE, LIQUID FILLED ORAL
Status: ACTIVE | COMMUNITY
Start: 2021-09-02

## 2024-06-20 RX ORDER — CARVEDILOL 3.12 MG/1
CARVEDILOL( 3.125MG ORAL   ) ACTIVE -HX ENTRY TABLET, FILM COATED ORAL
Status: ACTIVE | COMMUNITY
Start: 2021-09-02

## 2024-06-20 RX ORDER — METFORMIN HCL 500 MG/1
METFORMIN HCL( 500MG ORAL   ) ACTIVE -HX ENTRY TABLET ORAL
Status: ACTIVE | COMMUNITY
Start: 2021-09-02

## 2024-06-20 RX ORDER — PITAVASTATIN CALCIUM 1.04 MG/1
LIVALO( 1MG ORAL   ) ACTIVE -HX ENTRY TABLET, FILM COATED ORAL
Status: ACTIVE | COMMUNITY
Start: 2021-09-02

## 2024-06-20 RX ORDER — SILODOSIN 4 MG/1
RAPAFLO( 4MG ORAL   ) ACTIVE -HX ENTRY CAPSULE ORAL
Status: ACTIVE | COMMUNITY
Start: 2021-09-02

## 2024-06-20 RX ORDER — CLOPIDOGREL BISULFATE 75 MG
PLAVIX( 75MG ORAL   ) ACTIVE -HX ENTRY TABLET ORAL
Status: ACTIVE | COMMUNITY
Start: 2021-09-02

## 2024-06-20 RX ORDER — MEMANTINE HYDROCHLORIDE 5 MG/1
MEMANTINE HCL( 5MG ORAL   ) ACTIVE -HX ENTRY TABLET, FILM COATED ORAL
Status: ACTIVE | COMMUNITY
Start: 2021-09-02

## 2024-06-20 NOTE — HPI-TODAY'S VISIT:
Case of a 73-year-old male patient that comes in today for evaluation.  Patient refers that for some time now he has been experiencing what he describes as perianal discomfort.  He voices perianal itching and burning.  Some burning pain after defecation.  He has used over-the-counter hemorrhoidal ointments with limited response. On the last appointment he continued with hemorrhoidal symptoms despite maximal medical therapy. He underwent hemorrhoidal banding of the LL hemorrhoidal column. He still continue with hemorrhoidal symptoms.

## 2024-07-11 ENCOUNTER — OFFICE VISIT (OUTPATIENT)
Dept: URBAN - METROPOLITAN AREA CLINIC 68 | Facility: CLINIC | Age: 74
End: 2024-07-11
Payer: MEDICARE

## 2024-07-11 VITALS
WEIGHT: 184 LBS | SYSTOLIC BLOOD PRESSURE: 132 MMHG | DIASTOLIC BLOOD PRESSURE: 76 MMHG | BODY MASS INDEX: 25.76 KG/M2 | HEIGHT: 71 IN

## 2024-07-11 DIAGNOSIS — K64.1 GRADE II HEMORRHOIDS: ICD-10-CM

## 2024-07-11 PROCEDURE — 46221 LIGATION OF HEMORRHOID(S): CPT | Performed by: INTERNAL MEDICINE

## 2024-07-11 PROCEDURE — 99212 OFFICE O/P EST SF 10 MIN: CPT | Performed by: INTERNAL MEDICINE

## 2024-07-11 RX ORDER — CARVEDILOL 3.12 MG/1
CARVEDILOL( 3.125MG ORAL   ) ACTIVE -HX ENTRY TABLET, FILM COATED ORAL
Status: ACTIVE | COMMUNITY
Start: 2021-09-02

## 2024-07-11 RX ORDER — MEMANTINE HYDROCHLORIDE 5 MG/1
MEMANTINE HCL( 5MG ORAL   ) ACTIVE -HX ENTRY TABLET, FILM COATED ORAL
Status: ACTIVE | COMMUNITY
Start: 2021-09-02

## 2024-07-11 RX ORDER — PITAVASTATIN CALCIUM 1.04 MG/1
LIVALO( 1MG ORAL   ) ACTIVE -HX ENTRY TABLET, FILM COATED ORAL
Status: ACTIVE | COMMUNITY
Start: 2021-09-02

## 2024-07-11 RX ORDER — OMEGA-3-ACID ETHYL ESTERS 1 G/1
LOVAZA( 1GM ORAL   ) ACTIVE -HX ENTRY CAPSULE, LIQUID FILLED ORAL
Status: ACTIVE | COMMUNITY
Start: 2021-09-02

## 2024-07-11 RX ORDER — METFORMIN HCL 500 MG/1
METFORMIN HCL( 500MG ORAL   ) ACTIVE -HX ENTRY TABLET ORAL
Status: ACTIVE | COMMUNITY
Start: 2021-09-02

## 2024-07-11 RX ORDER — CLOPIDOGREL BISULFATE 75 MG
PLAVIX( 75MG ORAL   ) ACTIVE -HX ENTRY TABLET ORAL
Status: ACTIVE | COMMUNITY
Start: 2021-09-02

## 2024-07-11 RX ORDER — SILODOSIN 4 MG/1
RAPAFLO( 4MG ORAL   ) ACTIVE -HX ENTRY CAPSULE ORAL
Status: ACTIVE | COMMUNITY
Start: 2021-09-02

## 2024-07-11 NOTE — HPI-TODAY'S VISIT:
Case of a 73-year-old male patient that comes in today for evaluation.  Patient refers that for some time now he has been experiencing what he describes as perianal discomfort.  He voices perianal itching and burning.  Some burning pain after defecation.  He has used over-the-counter hemorrhoidal ointments with limited response. On the last appointment he continued with hemorrhoidal symptoms despite maximal medical therapy. He has undergone hemorrhoidal banding of the LL, RP hemorrhoidal column. He still refers some perianal discomfort. He still has some perianal itching and burning.

## 2024-08-08 ENCOUNTER — OFFICE VISIT (OUTPATIENT)
Dept: URBAN - METROPOLITAN AREA CLINIC 68 | Facility: CLINIC | Age: 74
End: 2024-08-08
Payer: MEDICARE

## 2024-08-08 VITALS
SYSTOLIC BLOOD PRESSURE: 133 MMHG | BODY MASS INDEX: 25.76 KG/M2 | DIASTOLIC BLOOD PRESSURE: 78 MMHG | WEIGHT: 184 LBS | HEIGHT: 71 IN

## 2024-08-08 DIAGNOSIS — L29.0 RECTAL ITCHING: ICD-10-CM

## 2024-08-08 DIAGNOSIS — K64.1 GRADE II HEMORRHOIDS: ICD-10-CM

## 2024-08-08 PROCEDURE — 99214 OFFICE O/P EST MOD 30 MIN: CPT | Performed by: INTERNAL MEDICINE

## 2024-08-08 RX ORDER — PITAVASTATIN CALCIUM 1.04 MG/1
LIVALO( 1MG ORAL   ) ACTIVE -HX ENTRY TABLET, FILM COATED ORAL
Status: ACTIVE | COMMUNITY
Start: 2021-09-02

## 2024-08-08 RX ORDER — SILODOSIN 4 MG/1
RAPAFLO( 4MG ORAL   ) ACTIVE -HX ENTRY CAPSULE ORAL
Status: ACTIVE | COMMUNITY
Start: 2021-09-02

## 2024-08-08 RX ORDER — MEMANTINE HYDROCHLORIDE 5 MG/1
MEMANTINE HCL( 5MG ORAL   ) ACTIVE -HX ENTRY TABLET, FILM COATED ORAL
Status: ACTIVE | COMMUNITY
Start: 2021-09-02

## 2024-08-08 RX ORDER — OMEGA-3-ACID ETHYL ESTERS 1 G/1
LOVAZA( 1GM ORAL   ) ACTIVE -HX ENTRY CAPSULE, LIQUID FILLED ORAL
Status: ACTIVE | COMMUNITY
Start: 2021-09-02

## 2024-08-08 RX ORDER — CARVEDILOL 3.12 MG/1
CARVEDILOL( 3.125MG ORAL   ) ACTIVE -HX ENTRY TABLET, FILM COATED ORAL
Status: ACTIVE | COMMUNITY
Start: 2021-09-02

## 2024-08-08 RX ORDER — METFORMIN HCL 500 MG/1
METFORMIN HCL( 500MG ORAL   ) ACTIVE -HX ENTRY TABLET ORAL
Status: ACTIVE | COMMUNITY
Start: 2021-09-02

## 2024-08-08 RX ORDER — CLOPIDOGREL BISULFATE 75 MG
PLAVIX( 75MG ORAL   ) ACTIVE -HX ENTRY TABLET ORAL
Status: ACTIVE | COMMUNITY
Start: 2021-09-02

## 2024-08-08 NOTE — HPI-TODAY'S VISIT:
Case of a 73-year-old male patient that comes in today for evaluation.  Patient refers that for some time now he has been experiencing what he describes as perianal discomfort.  He voices perianal itching and burning.  Some burning pain after defecation.  He has used over-the-counter hemorrhoidal ointments with limited response. On the last appointment he continued with hemorrhoidal symptoms despite maximal medical therapy. He has undergone hemorrhoidal banding of the LL, RP, RA hemorrhoidal column. He comes in today for follow up.He complains today of perianal itching sporadically.  No diarrhea.